# Patient Record
(demographics unavailable — no encounter records)

---

## 2025-07-08 NOTE — ASSESSMENT
[FreeTextEntry1] : Ms. YADAV is a 69 year woman, active smoker (1/2ppd x >40y, recently about 5 cigs daily), with PMH postnasal drip/ chronic sinusitis, COPD/emphysema, diabetes, seasonal allergies, Breast cancer s/p radiation, complicated by lung collapse x2, 24 y ago: requiring "tube" to re-expansion who presents to Pulmonary clinic for evaluation of respiratory conditions. Previously followed with Dr. Casillas, last seen 8/2023. Suspect predominantly COPD/emphysema sx. Is amenable to quitting smoking but having difficulty with use of NRT.   Plan: - C/w albuterol q4-6h PRN SOB/wheezing; can also use 15-30 mins prior to planned activity  - Pt to monitor for congestions sx, not amenable  - Smoking cessation counseling provided at length, all pharma and non-pharma options were discussed, risks of continued smoking explained. She will use NRT gum. Declining pharma at this time - LDCT lung ca screening before next visit - Obtain full PFTs prior to next appt, including lung volume, spirometry, bronchodilator responsiveness, DLCO - Return to clinic in 3 month to re-evaluate respiratory sx and review results of above. Pt knows to call for any interval pulmonary issues or concerns

## 2025-07-08 NOTE — HISTORY OF PRESENT ILLNESS
[Current] : current [>= 20 pack years] : >= 20 pack years [Never] : never [TextBox_4] : Ms. YADAV is a 69 year woman, active smoker (1/2ppd x >40y, recently about 5 cigs daily), with PMH postnasal drip/ chronic sinusitis, COPD/emphysema, diabetes, seasonal allergies, Breast cancer s/p radiation, complicated by lung collapse x2, 24 y ago: requiring "tube" to re-expansion who presents to Pulmonary clinic for evaluation of respiratory conditions. Previously followed with Dr. Casillas, last seen 8/2023. 07/08/2025 Pt says that when she runs to catch the bus and when she bends over to  something will have difficulty breathing. She has a hernia, follows with GI and underwent some procedures. She says that they did not tell her to take her asthma inhalers when undergoing evaluation for her hernia, was told by a nurse at that time that she was wheezing. Anesthesiologist told her to take inhaler regardless of her asymptomatic sx. When she woke up she was generally weak. She cannot lie flat in bed.  Uses DAYANNA only when she needs, eg with ROSSI, hot weather. The heat causes her to wheeze but does not cause dyspnea. In the past 2 weeks has only used albuterol twice. Has postansal drip (uses flonase) as well as chest congestion. At this time, she denies anginal/pleuritic CP, SOB at rest, stridor, orthopnea, hemoptysis, LE edema, sick contacts, recent travel, history of frequent URIs/LRTIs, recent use of antibiotics/steroids, recent hospitalization, f/c/n/v. [TextBox_11] : 1/2 [TextBox_13] : 30-40y